# Patient Record
Sex: MALE | Race: WHITE
[De-identification: names, ages, dates, MRNs, and addresses within clinical notes are randomized per-mention and may not be internally consistent; named-entity substitution may affect disease eponyms.]

---

## 2018-05-02 ENCOUNTER — HOSPITAL ENCOUNTER (EMERGENCY)
Dept: HOSPITAL 76 - ED | Age: 33
Discharge: HOME | End: 2018-05-02
Payer: OTHER GOVERNMENT

## 2018-05-02 VITALS — DIASTOLIC BLOOD PRESSURE: 76 MMHG | SYSTOLIC BLOOD PRESSURE: 121 MMHG

## 2018-05-02 VITALS — DIASTOLIC BLOOD PRESSURE: 87 MMHG | SYSTOLIC BLOOD PRESSURE: 123 MMHG

## 2018-05-02 DIAGNOSIS — R03.0: ICD-10-CM
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
## 2018-05-02 DIAGNOSIS — S05.01XA: Primary | ICD-10-CM

## 2018-05-02 PROCEDURE — 99283 EMERGENCY DEPT VISIT LOW MDM: CPT

## 2018-05-02 NOTE — ED PHYSICIAN DOCUMENTATION
PD HPI OPHTHO





- Stated complaint


Stated Complaint: EYE INJURY





- Chief complaint


Chief Complaint: Heent





- History obtained from


History obtained from: Patient





- History of Present Illness


Timing - onset: Today (His dog scratched him in the right eye with significant 

pain but no visual changes.  He is up-to-date on tetanus.)





Review of Systems


Constitutional: denies: Fever, Chills


Eyes: reports: Photophobia, Discharge, Irritation.  denies: Loss of vision, 

Decreased vision


Ears: denies: Loss of hearing, Ear pain





PD PAST MEDICAL HISTORY





- Present Medications


Home Medications: 


 Ambulatory Orders











 Medication  Instructions  Recorded  Confirmed


 


Cholecalciferol (Vitamin D3) 1 tab PO DAILY 05/02/18 05/02/18





[Vitamin D3]   


 


Erythromycin Base [Erythromycin] 1 applic OP 5XD 7 Days  oint...g. 05/02/18 


 


Ketorolac 0.45% Ophth Drops 1 each OPTH Q6H PRN #1 bot 05/02/18 





[Acuvail]   














- Allergies


Allergies/Adverse Reactions: 


 Allergies











Allergy/AdvReac Type Severity Reaction Status Date / Time


 


No Known Drug Allergies Allergy   Verified 05/02/18 13:28














PD ED PE NORMAL





- Vitals


Vital signs reviewed: Yes





- General


General: Alert and oriented X 3





- HEENT


HEENT: PERRL, EOMI, Other (On fluorescein examination there is a broad but 

shallow inferior corneal abrasion with negative Kole sign.)





- Neck


Neck: Supple, no meningeal sign, No bony TTP





- Neuro


Neuro: Alert and oriented X 3, Normal speech





Results





- Vitals


Vitals: 


 Vital Signs - 24 hr











  05/02/18 05/02/18





  13:24 13:36


 


Temperature 36.9 C 


 


Heart Rate 114 H 76


 


Respiratory 16 14





Rate  


 


Blood Pressure 139/84 H 147/95 H


 


O2 Saturation 96 98








 Oxygen











O2 Source                      Room air

















Departure





- Departure


Disposition: 01 Home, Self Care


Clinical Impression: 


Corneal abrasion, right


Qualifiers:


 Encounter type: initial encounter Qualified Code(s): S05.01XA - Injury of 

conjunctiva and corneal abrasion without foreign body, right eye, initial 

encounter





Condition: Good


Record reviewed to determine appropriate education?: Yes


Instructions:  ED Eye Injury Corneal Abrasion


Follow-Up: 


Boogie Hussein MD [Provider Admit Priv/Credential] - Within 3 Days


Prescriptions: 


Erythromycin Base [Erythromycin] 1 applic OP 5XD 7 Days  oint...g.


Ketorolac 0.45% Ophth Drops [Acuvail] 1 each OPTH Q6H PRN #1 bot


 PRN Reason: Pain


Comments: 


Your blood pressure was elevated today on check into the emergency department.  

This does not mean that you have hypertension, it is a common phenomenon to 

come to the emergency department and have elevated blood pressure.  I recommend 

that you see your primary care physician within the week to have it rechecked 

when you are feeling better.

## 2018-05-02 NOTE — ED PHYSICIAN DOCUMENTATION
PD HPI OPHTHO





- Stated complaint


Stated Complaint: RT EYE PX





- Chief complaint


Chief Complaint: Heent





- History obtained from


History obtained from: Patient





- History of Present Illness


Timing - onset: Today (Seen here earlier in the day for corneal abrasion, he 

declined pain medication at that time, he returns because his pain is 

uncontrolled.)





Review of Systems


Constitutional: reports: Reviewed and negative


Ears: denies: Loss of hearing, Ear pain


Nose: reports: Reviewed and negative





PD PAST MEDICAL HISTORY





- Present Medications


Home Medications: 


 Ambulatory Orders











 Medication  Instructions  Recorded  Confirmed


 


Cholecalciferol (Vitamin D3) 1 tab PO DAILY 05/02/18 05/02/18





[Vitamin D3]   


 


Erythromycin Base [Erythromycin] 1 applic OP 5XD 7 Days  oint...g. 05/02/18 


 


HYDROcod/ACETAM 5/325 [Norco 5/325] 1 - 2 ea PO Q6H PRN #10 tablet 05/02/18 


 


Ketorolac 0.45% Ophth Drops 1 each OPTH Q6H PRN #1 bot 05/02/18 





[Acuvail]   














- Allergies


Allergies/Adverse Reactions: 


 Allergies











Allergy/AdvReac Type Severity Reaction Status Date / Time


 


No Known Drug Allergies Allergy   Verified 05/02/18 15:41














PD ED PE NORMAL





- Vitals


Vital signs reviewed: Yes





- General


General: Alert and oriented X 3





- HEENT


HEENT: PERRL, EOMI, Other (The corneal abrasion is reexamined with fluorescein, 

still negative Kole sign, otherwise no interval change.)





- Neuro


Neuro: Alert and oriented X 3, Normal speech





Results





- Vitals


Vitals: 


 Vital Signs - 24 hr











  05/02/18





  15:39


 


Temperature 36.7 C


 


Heart Rate 110 H


 


Respiratory 20





Rate 


 


Blood Pressure 123/87 H


 


O2 Saturation 98








 Oxygen











O2 Source                      Room air

















Departure





- Departure


Disposition: 01 Home, Self Care


Clinical Impression: 


Corneal abrasion, right


Qualifiers:


 Encounter type: initial encounter Qualified Code(s): S05.01XA - Injury of 

conjunctiva and corneal abrasion without foreign body, right eye, initial 

encounter





Condition: Good


Record reviewed to determine appropriate education?: Yes


Instructions:  ED Eye Injury Corneal Abrasion


Follow-Up: 


Boogie Hussein MD [Provider Admit Priv/Credential] - Within 3 Days


Prescriptions: 


HYDROcod/ACETAM 5/325 [Norco 5/325] 1 - 2 ea PO Q6H PRN #10 tablet


 PRN Reason: Pain


Comments: 


Do not use the proparacaine eyedrop for more than 24 hours as discussed.





Do not drink or drive while taking narcotic pain medication.


Note that many narcotic pain relievers also contain Tylenol/acetaminophen.  

Please ensure that your total dose of acetaminophen from all sources does not 

exceed 3 g (3000 mg) per day.


You may get constipated while on this medication.  Take a stool softener such 

as Colace twice a day while you are on it.  Also add an over-the-counter 

laxative such as senna or MiraLAX on any day that you do not have a bowel 

movement.


If you received a narcotic pain medication or sedative while in the emergency 

department, do not drive for the next 24 hours.


Discharge Date/Time: 05/02/18 16:05

## 2020-03-26 ENCOUNTER — HOSPITAL ENCOUNTER (OUTPATIENT)
Dept: HOSPITAL 76 - COV | Age: 35
Discharge: HOME | End: 2020-03-26
Attending: FAMILY MEDICINE
Payer: OTHER GOVERNMENT

## 2020-03-26 DIAGNOSIS — R05: Primary | ICD-10-CM
